# Patient Record
Sex: FEMALE | Race: WHITE
[De-identification: names, ages, dates, MRNs, and addresses within clinical notes are randomized per-mention and may not be internally consistent; named-entity substitution may affect disease eponyms.]

---

## 2018-06-11 ENCOUNTER — HOSPITAL ENCOUNTER (OUTPATIENT)
Dept: HOSPITAL 92 - BICULT | Age: 68
Discharge: HOME | End: 2018-06-11
Attending: FAMILY MEDICINE
Payer: MEDICARE

## 2018-06-11 DIAGNOSIS — K83.8: Primary | ICD-10-CM

## 2018-06-11 PROCEDURE — 76705 ECHO EXAM OF ABDOMEN: CPT

## 2018-06-28 ENCOUNTER — HOSPITAL ENCOUNTER (OUTPATIENT)
Dept: HOSPITAL 92 - MRI | Age: 68
Discharge: HOME | End: 2018-06-28
Attending: SURGERY
Payer: MEDICARE

## 2018-06-28 DIAGNOSIS — Q44.5: Primary | ICD-10-CM

## 2018-06-28 DIAGNOSIS — K83.8: ICD-10-CM

## 2018-06-28 DIAGNOSIS — N28.1: ICD-10-CM

## 2018-06-28 PROCEDURE — A9579 GAD-BASE MR CONTRAST NOS,1ML: HCPCS

## 2018-06-28 PROCEDURE — 74183 MRI ABD W/O CNTR FLWD CNTR: CPT

## 2018-06-28 NOTE — MRI
MRI OF THE ABDOMEN WITHOUT AND WITH CONTRAST

MRCP:

 

HISTORY: 

Enlargement of the common bile duct seen on prior ultrasound.

 

COMPARISON: 

Gallbladder ultrasound 6/11/18.

 

TECHNIQUE: 

Multiplanar, multisequence MR images were obtained of the abdomen without and with IV contrast.  MRCP
 images were performed.

 

FINDINGS: 

The common bile duct is enlarged measuring 11 mm.  The common bile duct tapers abruptly to a normal c
aliber in the pancreatic head at the ampule of Vater.  The pancreatic duct is normal in caliber immed
iately adjacent to the common bile duct.  No mass is seen within the pancreatic head and no filling d
efects are seen in the common bile duct.  No significant central intrahepatic biliary dilatation is s
een.  The gallbladder shows no definite filling defects.

 

There are foci of high T2 signal in the bilateral kidneys measuring up to 3.3 cm in size which repres
ent cysts.  The adrenal glands, spleen, and pancreas are unremarkable.  No abdominal adenopathy is se
en.  No marrow signal abnormality is present.

 

IMPRESSION: 

1.  There is enlargement of the common bile duct without significant intrahepatic biliary dilatation.
  No definite filling defect or mass is seen where the common bile duct tapers at the ampule of Vater
.  This may not be clinically significant.  Correlate with LFTs.

 

2.  Bilateral renal cysts.

 

POS: [facility]

## 2021-01-11 ENCOUNTER — HOSPITAL ENCOUNTER (EMERGENCY)
Dept: HOSPITAL 9 - MADERS | Age: 71
Discharge: HOME | End: 2021-01-11
Payer: SELF-PAY

## 2021-01-11 DIAGNOSIS — R10.30: Primary | ICD-10-CM

## 2021-01-11 DIAGNOSIS — R10.813: ICD-10-CM

## 2021-01-11 DIAGNOSIS — R10.814: ICD-10-CM

## 2021-01-11 LAB
ALBUMIN SERPL BCG-MCNC: 3.9 G/DL (ref 3.4–4.8)
ALP SERPL-CCNC: 119 U/L (ref 40–110)
ALT SERPL W P-5'-P-CCNC: 28 U/L (ref 8–55)
ANION GAP SERPL CALC-SCNC: 15 MMOL/L (ref 10–20)
AST SERPL-CCNC: 34 U/L (ref 5–34)
BACTERIA UR QL AUTO: (no result) HPF
BASOPHILS # BLD AUTO: 0.1 THOU/UL (ref 0–0.2)
BASOPHILS NFR BLD AUTO: 1.4 % (ref 0–1)
BILIRUB SERPL-MCNC: 0.2 MG/DL (ref 0.2–1.2)
BUN SERPL-MCNC: 27 MG/DL (ref 9.8–20.1)
CALCIUM SERPL-MCNC: 8.3 MG/DL (ref 7.8–10.44)
CHLORIDE SERPL-SCNC: 102 MMOL/L (ref 98–107)
CO2 SERPL-SCNC: 24 MMOL/L (ref 23–31)
CREAT CL PREDICTED SERPL C-G-VRATE: 0 ML/MIN (ref 70–130)
EOSINOPHIL # BLD AUTO: 0.3 THOU/UL (ref 0–0.7)
EOSINOPHIL NFR BLD AUTO: 4.4 % (ref 0–10)
GLOBULIN SER CALC-MCNC: 2.5 G/DL (ref 2.4–3.5)
GLUCOSE SERPL-MCNC: 113 MG/DL (ref 83–110)
HGB BLD-MCNC: 13.3 G/DL (ref 12–16)
LYMPHOCYTES # BLD AUTO: 1.5 THOU/UL (ref 1.2–3.4)
LYMPHOCYTES NFR BLD AUTO: 20.7 % (ref 21–51)
MCH RBC QN AUTO: 32.4 PG (ref 27–31)
MCV RBC AUTO: 100.6 FL (ref 78–98)
MONOCYTES # BLD AUTO: 0.5 THOU/UL (ref 0.11–0.59)
MONOCYTES NFR BLD AUTO: 7.2 % (ref 0–10)
NEUTROPHILS # BLD AUTO: 4.8 THOU/UL (ref 1.4–6.5)
NEUTROPHILS NFR BLD AUTO: 66.3 % (ref 42–75)
PLATELET # BLD AUTO: 295 THOU/UL (ref 130–400)
POTASSIUM SERPL-SCNC: 3.7 MMOL/L (ref 3.5–5.1)
RBC # BLD AUTO: 4.1 MILL/UL (ref 4.2–5.4)
SODIUM SERPL-SCNC: 137 MMOL/L (ref 136–145)
SP GR UR STRIP: 1.01 (ref 1–1.03)
WBC # BLD AUTO: 7.2 THOU/UL (ref 4.8–10.8)
WBC UR QL AUTO: (no result) HPF (ref 0–3)

## 2021-01-11 PROCEDURE — 81015 MICROSCOPIC EXAM OF URINE: CPT

## 2021-01-11 PROCEDURE — 81003 URINALYSIS AUTO W/O SCOPE: CPT

## 2021-01-11 PROCEDURE — 85025 COMPLETE CBC W/AUTO DIFF WBC: CPT

## 2021-01-11 PROCEDURE — 74177 CT ABD & PELVIS W/CONTRAST: CPT

## 2021-01-11 PROCEDURE — 80053 COMPREHEN METABOLIC PANEL: CPT

## 2021-01-11 NOTE — CT
CT of the abdomen and pelvis:

January 11, 2021



COMPARISON: 7/13/2015



HISTORY: Intermittent left lower quadrant pain



TECHNIQUE: Axial CT imaging at 5 mm intervals from the lung bases through the pubic symphysis with in
travenous contrast. Coronal and sagittal reformatted imaging obtained.



FINDINGS: The visualized lung bases are unremarkable. There is no free intraperitoneal air. The lack 
of oral contrast limits assessment of the bowel.



There is mild nonspecific intrahepatic biliary dilatation. There is mild dilation of the pancreatic d
uct in the common bile duct appears dilated, measuring approximately 1 cm in transverse dimension,

increased in size when compared to the 7/13/2015 CT at which time it measured in the 7-8 mm range.



The spleen appears grossly unremarkable as do the kidneys and adrenal glands aside from stable upper 
pole left renal cyst measuring approximately 4.2 cm.



There is debris filling a somewhat distended stomach. There is a suture line associated with the dist
al aspect of the stomach which is stable when compared to the 7/13/2015 exam.



There appears to be wall thickening and/or decompression of the gastric antrum. In addition, the duod
enum appears thick-walled from the proximal aspect through the horizontal portion. The head of the

pancreas is difficult to separate from the prominent duodenum and is thus difficult to adequately ass
ess.



Limited evaluation of the bowel demonstrates no evidence for focal inflammatory change. Scattered are
as of colonic diverticulosis noted with no evidence for diverticulitis. There is extensive

atherosclerotic calcification of the abdominal aorta and its branches. No abdominal or pelvic lymphad
enopathy.



No worrisome lytic or blastic bone lesions are seen.



IMPRESSION: Mild intrahepatic and significant extrahepatic biliary dilation with prominence of the pa
ncreatic duct. Etiology is uncertain. There appears to be nonspecific wall thickening of the

duodenum which is inseparable from the pancreas. Duodenal and/or pancreatic mass is thus difficult to
 exclude given the biliary prominence mentioned above. Recommend GI consultation for endoscopy and

potential ERCP to evaluate for an underlying mass or stricture leading to biliary dilation.



CODE T



Reported By: Christofer Barbour 

Electronically Signed:  1/11/2021 8:21 PM

## 2021-01-17 ENCOUNTER — HOSPITAL ENCOUNTER (EMERGENCY)
Dept: HOSPITAL 92 - ERS | Age: 71
Discharge: HOME | End: 2021-01-17
Payer: SELF-PAY

## 2021-01-17 DIAGNOSIS — F41.9: ICD-10-CM

## 2021-01-17 DIAGNOSIS — Z79.899: ICD-10-CM

## 2021-01-17 DIAGNOSIS — F32.9: ICD-10-CM

## 2021-01-17 DIAGNOSIS — K29.00: Primary | ICD-10-CM

## 2021-01-17 LAB
ALBUMIN SERPL BCG-MCNC: 4.1 G/DL (ref 3.4–4.8)
ALP SERPL-CCNC: 117 U/L (ref 40–110)
ALT SERPL W P-5'-P-CCNC: 31 U/L (ref 8–55)
ANION GAP SERPL CALC-SCNC: 18 MMOL/L (ref 10–20)
AST SERPL-CCNC: 28 U/L (ref 5–34)
BASOPHILS # BLD AUTO: 0 THOU/UL (ref 0–0.2)
BASOPHILS NFR BLD AUTO: 0.1 % (ref 0–1)
BILIRUB SERPL-MCNC: 0.3 MG/DL (ref 0.2–1.2)
BUN SERPL-MCNC: 19 MG/DL (ref 9.8–20.1)
CALCIUM SERPL-MCNC: 9 MG/DL (ref 7.8–10.44)
CHLORIDE SERPL-SCNC: 99 MMOL/L (ref 98–107)
CO2 SERPL-SCNC: 26 MMOL/L (ref 23–31)
CREAT CL PREDICTED SERPL C-G-VRATE: 0 ML/MIN (ref 70–130)
EOSINOPHIL # BLD AUTO: 0 THOU/UL (ref 0–0.7)
EOSINOPHIL NFR BLD AUTO: 0.1 % (ref 0–10)
GLOBULIN SER CALC-MCNC: 2.6 G/DL (ref 2.4–3.5)
GLUCOSE SERPL-MCNC: 160 MG/DL (ref 83–110)
HGB BLD-MCNC: 14.4 G/DL (ref 12–16)
LIPASE SERPL-CCNC: 13 U/L (ref 8–78)
LYMPHOCYTES # BLD: 0.4 THOU/UL (ref 1.2–3.4)
LYMPHOCYTES NFR BLD AUTO: 5.6 % (ref 21–51)
MCH RBC QN AUTO: 33.2 PG (ref 27–31)
MCV RBC AUTO: 99.8 FL (ref 78–98)
MONOCYTES # BLD AUTO: 0.1 THOU/UL (ref 0.11–0.59)
MONOCYTES NFR BLD AUTO: 1.5 % (ref 0–10)
NEUTROPHILS # BLD AUTO: 6.7 THOU/UL (ref 1.4–6.5)
NEUTROPHILS NFR BLD AUTO: 92.8 % (ref 42–75)
PLATELET # BLD AUTO: 286 THOU/UL (ref 130–400)
POTASSIUM SERPL-SCNC: 4.1 MMOL/L (ref 3.5–5.1)
RBC # BLD AUTO: 4.34 MILL/UL (ref 4.2–5.4)
SODIUM SERPL-SCNC: 139 MMOL/L (ref 136–145)
WBC # BLD AUTO: 7.2 THOU/UL (ref 4.8–10.8)

## 2021-01-17 PROCEDURE — 84484 ASSAY OF TROPONIN QUANT: CPT

## 2021-01-17 PROCEDURE — 80053 COMPREHEN METABOLIC PANEL: CPT

## 2021-01-17 PROCEDURE — 71045 X-RAY EXAM CHEST 1 VIEW: CPT

## 2021-01-17 PROCEDURE — 83690 ASSAY OF LIPASE: CPT

## 2021-01-17 PROCEDURE — 36415 COLL VENOUS BLD VENIPUNCTURE: CPT

## 2021-01-17 PROCEDURE — 85025 COMPLETE CBC W/AUTO DIFF WBC: CPT

## 2021-01-17 PROCEDURE — 93005 ELECTROCARDIOGRAM TRACING: CPT

## 2021-01-17 NOTE — RAD
XR Chest 1 View Portable



HISTORY: Chest pain, epigastric pain



COMPARISON: 6/4/2016



FINDINGS: The heart size is normal. The aorta is tortuous. The lungs are well expanded without focal 
areas of consolidation, pneumothorax or pleural effusions.



IMPRESSION: No radiographic evidence of acute cardiopulmonary process.



Reported By: Topher Ho 

Electronically Signed:  1/17/2021 4:59 PM

## 2021-02-06 NOTE — EKG
Test Reason : 

Blood Pressure : ***/*** mmHG

Vent. Rate : 063 BPM     Atrial Rate : 063 BPM

   P-R Int : 094 ms          QRS Dur : 068 ms

    QT Int : 514 ms       P-R-T Axes : 043 046 026 degrees

   QTc Int : 525 ms

 

Sinus rhythm with short CA



Prolonged QT

Abnormal ECG

No change from 6/04/2016

 

Confirmed by RADAMES KERNS, OMARI ([id number]),  ELSI QUEZADA ([id number]) on 2/6/2021 8:35:18 PM

 

Referred By:             Confirmed By:OMARI CRUM MD